# Patient Record
Sex: MALE | Race: WHITE
[De-identification: names, ages, dates, MRNs, and addresses within clinical notes are randomized per-mention and may not be internally consistent; named-entity substitution may affect disease eponyms.]

---

## 2019-02-17 ENCOUNTER — HOSPITAL ENCOUNTER (EMERGENCY)
Dept: HOSPITAL 74 - JER | Age: 24
Discharge: HOME | End: 2019-02-17
Payer: COMMERCIAL

## 2019-02-17 VITALS — DIASTOLIC BLOOD PRESSURE: 74 MMHG | HEART RATE: 98 BPM | TEMPERATURE: 98 F | SYSTOLIC BLOOD PRESSURE: 116 MMHG

## 2019-02-17 VITALS — BODY MASS INDEX: 26.6 KG/M2

## 2019-02-17 DIAGNOSIS — F32.9: ICD-10-CM

## 2019-02-17 DIAGNOSIS — F41.8: ICD-10-CM

## 2019-02-17 DIAGNOSIS — J45.909: Primary | ICD-10-CM

## 2019-02-17 PROCEDURE — 3E0F7GC INTRODUCTION OF OTHER THERAPEUTIC SUBSTANCE INTO RESPIRATORY TRACT, VIA NATURAL OR ARTIFICIAL OPENING: ICD-10-PCS | Performed by: EMERGENCY MEDICINE

## 2019-02-17 NOTE — PDOC
History of Present Illness





- General


Chief Complaint: Wheezing


Stated Complaint: CHEST PAIN, S.O.B


Time Seen by Provider: 02/17/19 19:42


History Source: Patient


Exam Limitations: No Limitations





- History of Present Illness


Initial Comments: 





02/17/19 19:48


Patient is 23 year old male with h/o asthma, depression and anxiety c/o chest 

tightness and coughing x 1 month worsening this morning.  States he ran out of 

his MDI and so had to come to the ED for evaluation.  States he smokes MJ last 

night he smoke a lot and thinks that smoking is his problem.  Denies fever, sob

, chest pain.  Coughing productive of phlegm yellow.  Patient requesting to 

have follow-up with a psychiatrist, due to his history of depression and 

anxiety. States that he needs medications that will help him to stop smoking.  

Currently no suicidal or homicidal ideation.





PMD:  Dr. Aldair Boone


PMHX:  as above


PSOCHX:  ex heroine user, (+) MJ daily, occ etoh


ALL:  NKDA





GENERAL/CONSTITUTIONAL: [No fever or chills. No weakness. No weight change.]


HEAD, EYES, EARS, NOSE AND THROAT: [No change in vision. No ear pain or 

discharge. No sore throat.]


CARDIOVASCULAR: [No chest pain or shortness of breath.]


RESPIRATORY: [No cough, wheezing, or hemoptysis.]


GASTROINTESTINAL: [No nausea, vomiting, diarrhea or constipation. No rectal 

bleeding.]


GENITOURINARY: [No dysuria, frequency, or change in urination.]


MUSCULOSKELETAL: [No joint or muscle swelling or pain. No neck or back pain.]


SKIN AND BREASTS: [No rash or easy bruising.]


NEUROLOGIC: [No headache, vertigo, loss of consciousness, or loss of sensation.]


PSYCHIATRIC: (+) depression or anxiety.]


ENDOCRINE: [No increased thirst. No abnormal weight change.]


HEMATOLOGIC/LYMPHATIC: [No anemia, easy bleeding, or history of blood clots.]


ALLERGIC/IMMUNOLOGIC: [No hives or skin allergy. No latex allergy.]





GENERAL: [The patient is awake, alert, and fully oriented, in mild distress.]


HEAD: [Normal with no signs of trauma.]


EYES: [Pupils equal, round and reactive to light, extraocular movements intact, 

sclera anicteric, conjunctiva clear.]


ENT: [Ears normal, nares patent, oropharynx clear without exudates.  Moist 

mucous membranes.]


NECK: [Normal range of motion, supple without lymphadenopathy, JVD, or masses.]


LUNGS: [Breath sounds equal, mild wheezes b/l, coughing with deep inspiration, 

and no crackles.]


HEART: [Regular rate and rhythm, normal S1 and S2 without murmur, rub.]


ABDOMEN: [Soft, nontender, normoactive bowel sounds.  No guarding, no rebound.  

No masses.]


EXTREMITIES: [Normal range of motion, no edema.  No clubbing or cyanosis. No 

cords, erythema, or tenderness.]


NEUROLOGICAL: [Cranial nerves II through XII grossly intact.  Normal speech, 

normal gait.]


PSYCH: [Normal mood, normal affect.]


SKIN: [Warm, Dry, normal turgor, no rashes or lesions noted.]


4








Past History





- Past Medical History


Allergies/Adverse Reactions: 


 Allergies











Allergy/AdvReac Type Severity Reaction Status Date / Time


 


No Known Allergies Allergy   Verified 02/17/19 18:21











Home Medications: 


Ambulatory Orders





Albuterol Sulfate Inhaler - [Ventolin HFA Inhaler -] 1 - 2 inh PO Q4H #1 

inhaler 02/17/19 


Albuterol Sulfate Inhaler - [Ventolin Hfa Inhaler -] 1 - 2 inh PO Q4H 02/17/19 


Prednisone [Deltasone] 40 mg PO DAILY #10 tablet 02/17/19 











- Suicide/Smoking/Psychosocial Hx


Smoking History: Current every day smoker


Information on smoking cessation initiated: No





*Physical Exam





- Vital Signs


 Last Vital Signs











Temp Pulse Resp BP Pulse Ox


 


 99.2 F   101 H  20   114/64   96 


 


 02/17/19 18:28  02/17/19 18:28  02/17/19 18:28  02/17/19 18:28  02/17/19 18:28














Moderate Sedation





- Procedure Monitoring


Vital Signs: 


Procedure Monitoring Vital Signs











Temperature  99.2 F   02/17/19 18:28


 


Pulse Rate  101 H  02/17/19 18:28


 


Respiratory Rate  20   02/17/19 18:28


 


Blood Pressure  114/64   02/17/19 18:28


 


O2 Sat by Pulse Oximetry (%)  96   02/17/19 18:28











ED Treatment Course





- Medications


Given in the ED: 


ED Medications














Discontinued Medications














Generic Name Dose Route Start Last Admin





  Trade Name Freq  PRN Reason Stop Dose Admin


 


Albuterol/Ipratropium  2 amp  02/17/19 18:30  02/17/19 18:30





  Duoneb -  NEB  02/17/19 18:31  2 amp





  NOW ONE   Administration





     





     





     





     














Medical Decision Making





- Medical Decision Making





02/17/19 19:48


Patient is 23 year old male with h/o asthma, depression and anxiety c/o chest 

tightness and coughing x 1 month worsening this morning.  States he ran out of 

his MDI and so had to come to the ED for evaluation.  States he smokes MJ last 

night he smoke a lot and thinks that smoking is his problem.  Denies fever, sob

, chest pain.  Coughing productive of phlegm yellow.


Symptoms consistent with asthma and bronchospasms. We will treat with nebs 3, 

and a course of prednisone taper.   





02/17/19 21:52


p/e Lung clear, coughing resolved





 Selected Entries











  02/17/19





  21:39


 


Temperature 98.0 F


 


Pulse Rate [ 98 H





Left Apical] 


 


Respiratory 19





Rate 


 


Blood Pressure 116/74





[Left Arm] 


 


O2 Sat by Pulse 96





Oximetry (%) 














I discussed the physical exam findings, ancillary test results and final 

diagnoses with the patient. I answered all of the patient's questions. The 

patient was satisfied with the care received and felt comfortable with the 

discharge plan and treatment plan.  The Patient agrees to follow up with the 

primary care physician within 24-72 hours. 

















*DC/Admit/Observation/Transfer


Diagnosis at time of Disposition: 


Asthma


Qualifiers:


 Asthma severity: mild Asthma persistence: unspecified Asthma complication type

: uncomplicated Qualified Code(s): J45.909 - Unspecified asthma, uncomplicated








- Discharge Dispostion


Disposition: HOME


Condition at time of disposition: Stable





- Prescriptions


Prescriptions: 


Albuterol Sulfate Inhaler - [Ventolin HFA Inhaler -] 1 - 2 inh PO Q4H #1 inhaler


Prednisone [Deltasone] 40 mg PO DAILY #10 tablet





- Referrals


Referrals: 


Aldair Milan MD [Primary Care Provider] - 


Mallory Anders MD [Staff Physician] - 


Jeevan Romero NP [Nurse Practitioner] - 





- Patient Instructions


Printed Discharge Instructions:  [facility] for Asthma -- Adult


Additional Instructions: 


Your Discharge Instructions:


You must call primary care physician within 24 hours to arrange follow-up.  

Return to the Emergency Department with any new, persistent or worsening 

symptoms, for fever, chills, SOB, dizziness or any other concerning changes 

that may occur.  We have provided the number is a for  psychiatric services, 

call for follow-up.





- Post Discharge Activity